# Patient Record
(demographics unavailable — no encounter records)

---

## 2025-06-11 NOTE — HISTORY OF PRESENT ILLNESS
[de-identified] : headache [FreeTextEntry6] :  Pt with 3d h/o HA. HA onset is a few hrs after awakening. +/- nausea. No V. No aura. HA does not awaken from sleep   No h/o trauma or recent illness. CARLIN sl better in past 24 hrs. Has been taking ibuprofen prn No prior h/o HA issues. No fam h/o migraine

## 2025-06-11 NOTE — PHYSICAL EXAM
[NL] : regular rate and rhythm, normal S1, S2 audible, no murmurs [de-identified] : nl balance, heel-toe, finger-nose [FreeTextEntry5] : no nystagmus

## 2025-06-11 NOTE — PLAN
[TextEntry] : Cont ibuprofen prn. Phone f/u 2d- will consider imaging and/or neuro referral if not better

## 2025-06-11 NOTE — HISTORY OF PRESENT ILLNESS
[de-identified] : headache [FreeTextEntry6] :  Pt with 3d h/o HA. HA onset is a few hrs after awakening. +/- nausea. No V. No aura. HA does not awaken from sleep   No h/o trauma or recent illness. CARLIN sl better in past 24 hrs. Has been taking ibuprofen prn No prior h/o HA issues. No fam h/o migraine

## 2025-06-11 NOTE — PHYSICAL EXAM
[NL] : regular rate and rhythm, normal S1, S2 audible, no murmurs [de-identified] : nl balance, heel-toe, finger-nose [FreeTextEntry5] : no nystagmus